# Patient Record
Sex: FEMALE | Race: WHITE | NOT HISPANIC OR LATINO | Employment: STUDENT | URBAN - METROPOLITAN AREA
[De-identification: names, ages, dates, MRNs, and addresses within clinical notes are randomized per-mention and may not be internally consistent; named-entity substitution may affect disease eponyms.]

---
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
## 2024-01-24 ENCOUNTER — VIRTUAL VISIT (OUTPATIENT)
Dept: PSYCHOLOGY | Facility: CLINIC | Age: 19
End: 2024-01-24
Payer: COMMERCIAL

## 2024-01-24 DIAGNOSIS — F33.1 MODERATE EPISODE OF RECURRENT MAJOR DEPRESSIVE DISORDER (H): Primary | ICD-10-CM

## 2024-01-24 DIAGNOSIS — F41.1 GENERALIZED ANXIETY DISORDER: ICD-10-CM

## 2024-01-24 PROCEDURE — 90834 PSYTX W PT 45 MINUTES: CPT | Mod: 93 | Performed by: SOCIAL WORKER

## 2024-01-24 NOTE — PROGRESS NOTES
[facility]                                     Progress Note    Patient Name: Ruchi Lerner  Date: 2024         Service Type: Individual      Session Start Time: 100  Session End Time: 140     Session Length: 38-52 mins    Session #: 6    Attendees: Client attended alone    Service Modality:  Phone call visit    Provider verified identity through the following two step process.  Patient provided:  Patient  and Patient address    Telemedicine Visit: The patient's condition can be safely assessed and treated via synchronous audio and visual telemedicine encounter.      Reason for Telemedicine Visit: Patient has requested telehealth visit    Originating Site (Patient Location): Patient's home    Distant Site (Provider Location): Provider Remote Setting- Home Office    Consent:  The patient/guardian has verbally consented to: the potential risks and benefits of telemedicine (video visit) versus in person care; bill my insurance or make self-payment for services provided; and responsibility for payment of non-covered services.     Phone call visit    Distant Location (Provider):  Off-site    As the provider I attest to compliance with applicable laws and regulations related to telemedicine.    DATA  Interactive Complexity: No  Crisis: No        Progress Since Last Session (Related to Symptoms / Goals / Homework):   Symptoms: Feeling a lot better as of recently.  More like self, reports light box, medication and using coping skills are helping.  Has not had SI, able to keep self safe.    Homework:  None assigned from last time.      Episode of Care Goals: Satisfactory progress - PREPARATION (Decided to change - considering how); Intervened by negotiating a change plan and determining options / strategies for behavior change, identifying triggers, exploring social supports, and working towards setting a date to begin behavior change                 Current / Ongoing Stressors and  Concerns:              Started new job at a [employer].  Will be starting new courses soon which she is excited about.  Feeling increased MDD symptoms due to seasons changing - started higher dose of meds with PCP and received a happy light.                 Treatment Objective(s) Addressed in This Session:          Preparing for changes                 Intervention:              Safety plan due to past SI (no concerns currently), discussed upcoming MONICA and options related to this.      Worked on gathering insight on bodies cues and reading these proactively before anxiety feels unmanageable.  Psychoeducation on this and used MI to gather further insight.  Used scaling questions, open ended questions, rolling with resistances, asking permission to raise concern or advise.  Discussed adding body/mind check ins 2-3 times at certain times each day, as Ruchi identified struggling with realizing how her body and mind are feeling until after the anxiety has completely passed looking back.  Discussed recommendations for phototherapy (morning use 30 mins after waking up for about 30 mins max, not staring directly at the light, etc).          Reviewed topics from last time (happy light, daily check ins, is going to the gym more), recommended two mood tracking apps.    Motivational Interviewing  Target Behavior:  Getting to class    Stage of Change: PREPARATION (Decided to change - considering how)    MI Intervention: Co-Developed Goal: Attending class regularly, Expressed Empathy/Understanding, Supported Autonomy, Collaboration, Evocation, Permission to raise concern or advise, Open-ended questions, and Reflections: simple and complex     Change Talk Expressed by the Patient: Desire to change Ability to change Reasons to change Need to change Committment to change Activation Taking steps    Provider Response to Change Talk: E - Evoked more info from patient about behavior change, A - Affirmed patient's thoughts,  decisions, or attempts at behavior change, R - Reflected patient's change talk, and S - Summarized patient's change talk statements    Emphasized writing down goals and rewarding self for hitting goals or milestones.    CURRENT: CBT/DBT skills of checking the facts used.  Exploration of coping skills to calm nervous system, practiced intentional pause skill.  Patient reported increased irritability and when/what is triggering it.  Normalized this as a symptom of depression and how to navigate it.  Explored use of CBT skills at work when anxiety comes up about connecting with coworkers.      Assessments completed prior to visit:  The following assessments were completed by patient for this visit:  PHQ9:   PHQ-9 SCORE 3/17/2021 5/21/2021 5/28/2021 3/11/2022 7/1/2022 11/4/2022 1/2/2023   PHQ-9 Total Score MyChart 9 (Mild depression) - - - - - 15 (Moderately severe depression)   PHQ-9 Total Score 9 - - 11 - - 15   PHQ-A Total Score - 14 14 11 9 9 -      GAD7:   CHERYL-7 SCORE 3/17/2021 5/21/2021 5/28/2021 3/11/2022 7/1/2022 11/4/2022 1/2/2023   Total Score 13 (moderate anxiety) - - - - - 11 (moderate anxiety)   Total Score 13 9 9 13 9 8 11      PROMIS Pediatric Scale v1.0 -Global Health 7+2: No questionnaires on file.                         ASSESSMENT: Current Emotional / Mental Status (status of significant symptoms):              Risk status (Self / Other harm or suicidal ideation)              Patient denies current fears or concerns for personal safety.              Patient denies current or recent suicidal ideation or behaviors.              Patient denies current or recent homicidal ideation or behaviors.              Patient denies current or recent self injurious behavior or ideation.              Patient denies other safety concerns.              Patient reports there has been no change in risk factors since their last session.                Patient reports there has been no change in protective factors since  their last session.                Recommended that patient call 911 or go to the local ED should there be a change in any of these risk factors.                 Appearance:                            Good, normal               Eye Contact:                           fair              Psychomotor Behavior:          Good, normal              Attitude:                                   Cooperative               Orientation:                             All              Speech                          Rate / Production:       Normal/ Responsive Normal                           Volume:                       Normal               Mood:                                      tired              Affect:                                      Subdued               Thought Content:                    Clear               Thought Form:                        Coherent  Logical               Insight:                                     Good                  Medication Review:              No changes to current psychiatric medication(s)                 Medication Compliance:              Yes                 Changes in Health Issues:              None reported                 Chemical Use Review:              Substance Use: Chemical use reviewed, no active concerns identified                  Tobacco Use: No current tobacco use.       Diagnosis:  1. Generalized anxiety disorder    2. Major depressive disorder, recurrent, in partial remission (H)          Collateral Reports Completed:              Not Applicable     PLAN: (Patient Tasks / Therapist Tasks / Other)  Continue with individual therapy. Work on preparing for changes.     Patient taking a break from therapy while primary therapist is out - will call to make return appointment mid-April.        TREVOR Singleton, LICSW                                                              ______________________________________________________________________     Individual Treatment  Plan     Patient's Name: Ruchi Lerner                       YOB: 2005     Date of Creation: 2019  Date Treatment Plan Last Reviewed/Revised: 6/1/2022, 9/28/2022, 1/2023, 4/2023; 7/26/2023; 11/22/2023        DSM5 Diagnoses: 296.31 (F33.0) Major Depressive Disorder, Recurrent Episode, Mild _ and With anxious distress or 300.02 (F41.1) Generalized Anxiety Disorder  Psychosocial / Contextual Factors: history of anxiety and depression  PROMIS (reviewed every 90 days):      Referral / Collaboration:  Referral to another professional/service is not indicated at this time..     Anticipated number of session for this episode of care: 15-20  Anticipation frequency of session: Monthly  Anticipated Duration of each session: 38-52 minutes  Treatment plan will be reviewed in 90 days or when goals have been changed.      MeasurableTreatment Goal(s) related to diagnosis / functional impairment(s)  Goal 1: Report a decrease in anxiety symptoms.      Objective #A (Client Action)                Status: Continued- Date(s): 11/22/2023     Client will use cognitive strategies identified in therapy to challenge anxious thoughts.     Intervention(s)  Therapist will explore origin of anxious thoughts using CBT.     Objective #B  Client will seek support from others when emotions are high and effectively communicate her emotions.                Status: Continued- Date(s): 11/22/2023        Intervention(s)  Therapist will teach new coping skills to practice and assign as homework.        Client and Parent / Guardian have reviewed and agreed to the above plan.        TREVOR Singleton, [facility] Care[facility]                                                                    Rosa Elena Leary [facility]  11/22/2023          [facility]                                       Ruchi Lerner     SAFETY PLAN:  Step 1: Warning signs / cues (Thoughts, images, mood, situation, behavior) that a crisis may be  "developing:  Thoughts: \"I don't matter\", \"I'm a burden\", and \"Nothing makes it better\"  Images: visions of harm: when feeling really upset, picturing self with cuts on wrists (hasn't happened recently but has happened in the past).  Thinking Processes: ruminations (can't stop thinking about my problems): feeling like I keep cycling through the same things,  and intrusive thoughts (bothersome, unwanted thoughts that come out of nowhere): where I will get a bad thought and it won't go away  Mood: worsening depression, intense anger, and agitation  Behaviors: not taking care of myself, not taking care of my responsibilities, and sleeping too much  Situations: relationship problems and issues with work school or increased stress     Step 2: Coping strategies - Things I can do to take my mind off of my problems without contacting another person (relaxation technique, physical activity):  Distress Tolerance Strategies:  arts and crafts: coloring book, play with my pet , and watch a funny movie: or tv show, reading   Physical Activities: go for a walk, meditation, and deep breathing  Focus on helpful thoughts:  self-compassion statements: it's okay to make mistakes, it's only temporary and things will be okay, focusing on what's important to me (family, boyfriend), focusing on happy memories.  Step 3: People and social settings that provide distraction:   Name: Darcy Bonilla Phone: phone number in phone   Name: Brother Johnathan Lange  Phone: phone number in phone   Name: Work friend - Tisha Phone: phone number in phone  park and [employer], being in my room, going to the mall    Step 4: Remind myself of people and things that are important to me and worth living for:  family and boyfriend, dog and cat, and friends  Step 5: When I am in crisis, I can ask these people to help me use my safety plan:   Name: Brother Johnathan Lange  Phone: Number in phone   Name: Darcy Bonilla Phone: Number in phone   Name: Parents   Phone: Number " in phone  Step 6: Making the environment safe:   be around others  Step 7: Professionals or agencies I can contact during a crisis:  The new Crisis line from any phone is 988, you can also text a message to this line.      Professionals or agencies I can contact during a crisis:    Suicide Prevention Lifeline: just dial 988  Crisis Text Line Service (available 24 hours a day, 7 days a week): Text [place] to 992552  Suicide LifeLine Chat: suicidepreventionlifeline.org/chat    COMMUNITY RESOURCES FOR MENTAL HEALTH EMERGENCIES:      [facility] [phone]   COPE 24/7 [place] Mobile Team [phone] (adults) [phone] (child)  Poison Control Center [phone]    OR  go to nearest ER  [facility]  [phone]  National [place] on Mental Illness (www.roni.org): 827.708.4494 or 240-739-2965.   Mental Health Association (www.mentalhealth.org): 779.359.1835 or 255-964-5500.  [employer]/resources for a list of additional resources (SOS)            [facility] [phone]   Urgent Care Adult Mental Health-[phone] mobile unit 24/7 crisis line  EMPATH unit in [facility]       Crisis Services By [facility]: Phone Number:   [place]     [phone]   [place]  [phone]   [facility]  [phone]   [place]    [phone]   [place]/ [place]    [phone]   [place] [phone]   [place]    [phone]   [place]    [phone]   [place] [phone]   [place]     [phone]         Call 911 or go to my nearest emergency department.   I helped develop this safety plan and agree to use it when needed.  I have been given a copy of this plan.      Client signature ____________Client agrees verbally, able to keep self safe, will send via [employer]  Today s date:  11/8/2023  Completed by Provider Name/ Credentials:  TREVOR Singleton, LICSW  November 8, 2023  Adapted from Safety Plan Template 2008 Shima Jc and  [place] is reprinted with the express permission of the authors.  No portion of the Safety Plan Template may be reproduced without the express, written permission.  You can contact the authors at [email or web address] or [email or web address].

## 2024-03-23 DIAGNOSIS — F41.1 GENERALIZED ANXIETY DISORDER: ICD-10-CM

## 2024-03-23 DIAGNOSIS — F33.41 MAJOR DEPRESSIVE DISORDER, RECURRENT, IN PARTIAL REMISSION (H): ICD-10-CM

## 2024-06-14 DIAGNOSIS — Z30.41 ENCOUNTER FOR SURVEILLANCE OF CONTRACEPTIVE PILLS: ICD-10-CM

## 2024-06-14 RX ORDER — NORGESTIMATE AND ETHINYL ESTRADIOL 0.25-0.035
1 KIT ORAL DAILY
Qty: 84 TABLET | Refills: 0 | Status: SHIPPED | OUTPATIENT
Start: 2024-06-14 | End: 2024-07-29

## 2024-06-14 RX ORDER — NORGESTIMATE AND ETHINYL ESTRADIOL 0.25-0.035
1 KIT ORAL DAILY
Qty: 84 TABLET | Refills: 3 | OUTPATIENT
Start: 2024-06-14

## 2024-06-14 NOTE — TELEPHONE ENCOUNTER
Mom calling.  Patient present and gave verbal consent for clinic to speak with mom.  No refills remaining on file at the pharmacy for birth control pills.  Will be out before appointment on 7/29/2024  Has about 2 weeks left.    Refill sent    Didi Ureña RN  [facility]

## 2024-07-12 DIAGNOSIS — F33.41 MAJOR DEPRESSIVE DISORDER, RECURRENT, IN PARTIAL REMISSION (H): ICD-10-CM

## 2024-07-12 DIAGNOSIS — F41.1 GENERALIZED ANXIETY DISORDER: ICD-10-CM

## 2024-07-29 ENCOUNTER — OFFICE VISIT (OUTPATIENT)
Dept: FAMILY MEDICINE | Facility: CLINIC | Age: 19
End: 2024-07-29
Payer: COMMERCIAL

## 2024-07-29 VITALS
HEART RATE: 81 BPM | OXYGEN SATURATION: 97 % | RESPIRATION RATE: 16 BRPM | WEIGHT: 159 LBS | HEIGHT: 62 IN | BODY MASS INDEX: 29.26 KG/M2 | DIASTOLIC BLOOD PRESSURE: 64 MMHG | SYSTOLIC BLOOD PRESSURE: 124 MMHG | TEMPERATURE: 97.6 F

## 2024-07-29 DIAGNOSIS — Z00.00 ROUTINE GENERAL MEDICAL EXAMINATION AT A HEALTH CARE FACILITY: Primary | ICD-10-CM

## 2024-07-29 DIAGNOSIS — Z30.41 ENCOUNTER FOR SURVEILLANCE OF CONTRACEPTIVE PILLS: ICD-10-CM

## 2024-07-29 DIAGNOSIS — F33.41 MAJOR DEPRESSIVE DISORDER, RECURRENT, IN PARTIAL REMISSION (H): ICD-10-CM

## 2024-07-29 DIAGNOSIS — F41.1 GENERALIZED ANXIETY DISORDER: ICD-10-CM

## 2024-07-29 DIAGNOSIS — Z11.3 SCREENING FOR STDS (SEXUALLY TRANSMITTED DISEASES): ICD-10-CM

## 2024-07-29 PROBLEM — F10.920 ALCOHOLIC INTOXICATION WITHOUT COMPLICATION (H): Status: RESOLVED | Noted: 2021-06-02 | Resolved: 2024-07-29

## 2024-07-29 PROCEDURE — 99395 PREV VISIT EST AGE 18-39: CPT | Mod: 25 | Performed by: PHYSICIAN ASSISTANT

## 2024-07-29 PROCEDURE — 99213 OFFICE O/P EST LOW 20 MIN: CPT | Mod: 25 | Performed by: PHYSICIAN ASSISTANT

## 2024-07-29 PROCEDURE — 90480 ADMN SARSCOV2 VAC 1/ONLY CMP: CPT | Performed by: PHYSICIAN ASSISTANT

## 2024-07-29 PROCEDURE — 87491 CHLMYD TRACH DNA AMP PROBE: CPT | Performed by: PHYSICIAN ASSISTANT

## 2024-07-29 PROCEDURE — 87591 N.GONORRHOEAE DNA AMP PROB: CPT | Performed by: PHYSICIAN ASSISTANT

## 2024-07-29 PROCEDURE — 91320 SARSCV2 VAC 30MCG TRS-SUC IM: CPT | Performed by: PHYSICIAN ASSISTANT

## 2024-07-29 RX ORDER — NORGESTIMATE AND ETHINYL ESTRADIOL 0.25-0.035
1 KIT ORAL DAILY
Qty: 84 TABLET | Refills: 3 | Status: SHIPPED | OUTPATIENT
Start: 2024-07-29

## 2024-07-29 RX ORDER — BUSPIRONE HYDROCHLORIDE 5 MG/1
5 TABLET ORAL 2 TIMES DAILY
Qty: 60 TABLET | Refills: 1 | Status: SHIPPED | OUTPATIENT
Start: 2024-07-29 | End: 2024-08-26

## 2024-07-29 SDOH — HEALTH STABILITY: PHYSICAL HEALTH: ON AVERAGE, HOW MANY DAYS PER WEEK DO YOU ENGAGE IN MODERATE TO STRENUOUS EXERCISE (LIKE A BRISK WALK)?: 3 DAYS

## 2024-07-29 ASSESSMENT — ANXIETY QUESTIONNAIRES
GAD7 TOTAL SCORE: 16
8. IF YOU CHECKED OFF ANY PROBLEMS, HOW DIFFICULT HAVE THESE MADE IT FOR YOU TO DO YOUR WORK, TAKE CARE OF THINGS AT HOME, OR GET ALONG WITH OTHER PEOPLE?: VERY DIFFICULT
1. FEELING NERVOUS, ANXIOUS, OR ON EDGE: NEARLY EVERY DAY
7. FEELING AFRAID AS IF SOMETHING AWFUL MIGHT HAPPEN: MORE THAN HALF THE DAYS
IF YOU CHECKED OFF ANY PROBLEMS ON THIS QUESTIONNAIRE, HOW DIFFICULT HAVE THESE PROBLEMS MADE IT FOR YOU TO DO YOUR WORK, TAKE CARE OF THINGS AT HOME, OR GET ALONG WITH OTHER PEOPLE: VERY DIFFICULT
4. TROUBLE RELAXING: NEARLY EVERY DAY
GAD7 TOTAL SCORE: 16
3. WORRYING TOO MUCH ABOUT DIFFERENT THINGS: NEARLY EVERY DAY
6. BECOMING EASILY ANNOYED OR IRRITABLE: SEVERAL DAYS
7. FEELING AFRAID AS IF SOMETHING AWFUL MIGHT HAPPEN: MORE THAN HALF THE DAYS
5. BEING SO RESTLESS THAT IT IS HARD TO SIT STILL: SEVERAL DAYS
2. NOT BEING ABLE TO STOP OR CONTROL WORRYING: NEARLY EVERY DAY
GAD7 TOTAL SCORE: 16

## 2024-07-29 ASSESSMENT — PATIENT HEALTH QUESTIONNAIRE - PHQ9
SUM OF ALL RESPONSES TO PHQ QUESTIONS 1-9: 11
10. IF YOU CHECKED OFF ANY PROBLEMS, HOW DIFFICULT HAVE THESE PROBLEMS MADE IT FOR YOU TO DO YOUR WORK, TAKE CARE OF THINGS AT HOME, OR GET ALONG WITH OTHER PEOPLE: SOMEWHAT DIFFICULT
SUM OF ALL RESPONSES TO PHQ QUESTIONS 1-9: 11

## 2024-07-29 ASSESSMENT — SOCIAL DETERMINANTS OF HEALTH (SDOH): HOW OFTEN DO YOU GET TOGETHER WITH FRIENDS OR RELATIVES?: TWICE A WEEK

## 2024-07-29 NOTE — PATIENT INSTRUCTIONS
Patient Education   Preventive Care Advice   This is general advice given by our system to help you stay healthy. However, your care team may have specific advice just for you. Please talk to your care team about your preventive care needs.  Nutrition  Eat 5 or more servings of fruits and vegetables each day.  Try wheat bread, brown rice and whole grain pasta (instead of white bread, rice, and pasta).  Get enough calcium and vitamin D. Check the label on foods and aim for 100% of the RDA (recommended daily allowance).  Lifestyle  Exercise at least 150 minutes each week  (30 minutes a day, 5 days a week).  Do muscle strengthening activities 2 days a week. These help control your weight and prevent disease.  No smoking.  Wear sunscreen to prevent skin cancer.  Have a dental exam and cleaning every 6 months.  Yearly exams  See your health care team every year to talk about:  Any changes in your health.  Any medicines your care team has prescribed.  Preventive care, family planning, and ways to prevent chronic diseases.  Shots (vaccines)   HPV shots (up to age 26), if you've never had them before.  Hepatitis B shots (up to age 59), if you've never had them before.  COVID-19 shot: Get this shot when it's due.  Flu shot: Get a flu shot every year.  Tetanus shot: Get a tetanus shot every 10 years.  Pneumococcal, hepatitis A, and RSV shots: Ask your care team if you need these based on your risk.  Shingles shot (for age 50 and up)  General health tests  Diabetes screening:  Starting at age 35, Get screened for diabetes at least every 3 years.  If you are younger than age 35, ask your care team if you should be screened for diabetes.  Cholesterol test: At age 39, start having a cholesterol test every 5 years, or more often if advised.  Bone density scan (DEXA): At age 50, ask your care team if you should have this scan for osteoporosis (brittle bones).  Hepatitis C: Get tested at least once in your life.  STIs (sexually  transmitted infections)  Before age 24: Ask your care team if you should be screened for STIs.  After age 24: Get screened for STIs if you're at risk. You are at risk for STIs (including HIV) if:  You are sexually active with more than one person.  You don't use condoms every time.  You or a partner was diagnosed with a sexually transmitted infection.  If you are at risk for HIV, ask about PrEP medicine to prevent HIV.  Get tested for HIV at least once in your life, whether you are at risk for HIV or not.  Cancer screening tests  Cervical cancer screening: If you have a cervix, begin getting regular cervical cancer screening tests starting at age 21.  Breast cancer scan (mammogram): If you've ever had breasts, begin having regular mammograms starting at age 40. This is a scan to check for breast cancer.  Colon cancer screening: It is important to start screening for colon cancer at age 45.  Have a colonoscopy test every 10 years (or more often if you're at risk) Or, ask your provider about stool tests like a FIT test every year or Cologuard test every 3 years.  To learn more about your testing options, visit:   .  For help making a decision, visit:   https://bit.ly/du67851.  Prostate cancer screening test: If you have a prostate, ask your care team if a prostate cancer screening test (PSA) at age 55 is right for you.  Lung cancer screening: If you are a current or former smoker ages 50 to 80, ask your care team if ongoing lung cancer screenings are right for you.  For informational purposes only. Not to replace the advice of your health care provider. Copyright   2023 [facility]. All rights reserved. Clinically reviewed by the [facility]. [employer] 019820 - REV 01/24.  Learning About Stress  What is stress?     Stress is your body's response to a hard situation. Your body can have a physical, emotional, or mental response. Stress is a fact of life for most people, and it  affects everyone differently. What causes stress for you may not be stressful for someone else.  A lot of things can cause stress. You may feel stress when you go on a job interview, take a test, or run a race. This kind of short-term stress is normal and even useful. It can help you if you need to work hard or react quickly. For example, stress can help you finish an important job on time.  Long-term stress is caused by ongoing stressful situations or events. Examples of long-term stress include long-term health problems, ongoing problems at work, or conflicts in your family. Long-term stress can harm your health.  How does stress affect your health?  When you are stressed, your body responds as though you are in danger. It makes hormones that speed up your heart, make you breathe faster, and give you a burst of energy. This is called the fight-or-flight stress response. If the stress is over quickly, your body goes back to normal and no harm is done.  But if stress happens too often or lasts too long, it can have bad effects. Long-term stress can make you more likely to get sick, and it can make symptoms of some diseases worse. If you tense up when you are stressed, you may develop neck, shoulder, or low back pain. Stress is linked to high blood pressure and heart disease.  Stress also harms your emotional health. It can make you ruiz, tense, or depressed. Your relationships may suffer, and you may not do well at work or school.  What can you do to manage stress?  You can try these things to help manage stress:   Do something active. Exercise or activity can help reduce stress. Walking is a great way to get started. Even everyday activities such as housecleaning or yard work can help.  Try yoga or david chi. These techniques combine exercise and meditation. You may need some training at first to learn them.  Do something you enjoy. For example, listen to music or go to a movie. Practice your hobby or do volunteer  "work.  Meditate. This can help you relax, because you are not worrying about what happened before or what may happen in the future.  Do guided imagery. Imagine yourself in any setting that helps you feel calm. You can use online videos, books, or a teacher to guide you.  Do breathing exercises. For example:  From a standing position, bend forward from the waist with your knees slightly bent. Let your arms dangle close to the floor.  Breathe in slowly and deeply as you return to a standing position. Roll up slowly and lift your head last.  Hold your breath for just a few seconds in the standing position.  Breathe out slowly and bend forward from the waist.  Let your feelings out. Talk, laugh, cry, and express anger when you need to. Talking with supportive friends or family, a counselor, or a kurt leader about your feelings is a healthy way to relieve stress. Avoid discussing your feelings with people who make you feel worse.  Write. It may help to write about things that are bothering you. This helps you find out how much stress you feel and what is causing it. When you know this, you can find better ways to cope.  What can you do to prevent stress?  You might try some of these things to help prevent stress:  Manage your time. This helps you find time to do the things you want and need to do.  Get enough sleep. Your body recovers from the stresses of the day while you are sleeping.  Get support. Your family, friends, and community can make a difference in how you experience stress.  Limit your news feed. Avoid or limit time on social media or news that may make you feel stressed.  Do something active. Exercise or activity can help reduce stress. Walking is a great way to get started.  Where can you learn more?  Go to https://www.healthwise.net/patiented  Enter N032 in the search box to learn more about \"Learning About Stress.\"  Current as of: October 24, 2023               Content Version: 14.0    6755-2857 " [employer].   Care instructions adapted under license by your healthcare professional. If you have questions about a medical condition or this instruction, always ask your healthcare professional. [employer] disclaims any warranty or liability for your use of this information.      Learning About Depression Screening  What is depression screening?  Depression screening is a way to see if you have depression symptoms. It may be done by a doctor or counselor. It's often part of a routine checkup. That's because your mental health is just as important as your physical health.  Depression is a mental health condition that affects how you feel, think, and act. You may:  Have less energy.  Lose interest in your daily activities.  Feel sad and grouchy for a long time.  Depression is very common. It affects people of all ages.  Many things can lead to depression. Some people become depressed after they have a stroke or find out they have a major illness like cancer or heart disease. The death of a loved one or a breakup may lead to depression. It can run in families. Most experts believe that a combination of inherited genes and stressful life events can cause it.  What happens during screening?  You may be asked to fill out a form about your depression symptoms. You and the doctor will discuss your answers. The doctor may ask you more questions to learn more about how you think, act, and feel.  What happens after screening?  If you have symptoms of depression, your doctor will talk to you about your options.  Doctors usually treat depression with medicines or counseling. Often, combining the two works best. Many people don't get help because they think that they'll get over the depression on their own. But people with depression may not get better unless they get treatment.  The cause of depression is not well understood. There may be many factors involved. But if you have depression, it's not  "your fault.  A serious symptom of depression is thinking about death or suicide. If you or someone you care about talks about this or about feeling hopeless, get help right away.  It's important to know that depression can be treated. Medicine, counseling, and self-care may help.  Where can you learn more?  Go to https://www.[employer]/patiented  Enter T185 in the search box to learn more about \"Learning About Depression Screening.\"  Current as of: June 24, 2023               Content Version: 14.0    0923-9082 [employer].   Care instructions adapted under license by your healthcare professional. If you have questions about a medical condition or this instruction, always ask your healthcare professional. [employer] disclaims any warranty or liability for your use of this information.      Substance Use Disorder: Care Instructions  Overview     You can improve your life and health by stopping your use of alcohol or drugs. When you don't drink or use drugs, you may feel and sleep better. You may get along better with your family, friends, and coworkers. There are medicines and programs that can help with substance use disorder.  How can you care for yourself at home?  Here are some ways to help you stay sober and prevent relapse.  If you have been given medicine to help keep you sober or reduce your cravings, be sure to take it exactly as prescribed.  Talk to your doctor about programs that can help you stop using drugs or drinking alcohol.  Do not keep alcohol or drugs in your home.  Plan ahead. Think about what you'll say if other people ask you to drink or use drugs. Try not to spend time with people who drink or use drugs.  Use the time and money spent on drinking or drugs to do something that's important to you.  Preventing a relapse  Have a plan to deal with relapse. Learn to recognize changes in your thinking that lead you to drink or use drugs. Get help before you start to drink " or use drugs again.  Try to stay away from situations, friends, or places that may lead you to drink or use drugs.  If you feel the need to drink alcohol or use drugs again, seek help right away. Call a trusted friend or family member. Some people get support from organizations such as Narcotics Anonymous or [employer] or from treatment facilities.  If you relapse, get help as soon as you can. Some people make a plan with another person that outlines what they want that person to do for them if they relapse. The plan usually includes how to handle the relapse and who to notify in case of relapse.  Don't give up. Remember that a relapse doesn't mean that you have failed. Use the experience to learn the triggers that lead you to drink or use drugs. Then quit again. Recovery is a lifelong process. Many people have several relapses before they are able to quit for good.  Follow-up care is a key part of your treatment and safety. Be sure to make and go to all appointments, and call your doctor if you are having problems. It's also a good idea to know your test results and keep a list of the medicines you take.  When should you call for help?   Call 531  anytime you think you may need emergency care. For example, call if you or someone else:    Has overdosed or has withdrawal signs. Be sure to tell the emergency workers that you are or someone else is using or trying to quit using drugs. Overdose or withdrawal signs may include:  Losing consciousness.  Seizure.  Seeing or hearing things that aren't there (hallucinations).     Is thinking or talking about suicide or harming others.   Where to get help 24 hours a day, 7 days a week   If you or someone you know talks about suicide, self-harm, a mental health crisis, a substance use crisis, or any other kind of emotional distress, get help right away. You can:    Call the Suicide and Crisis Lifeline at 823.     Call 7-817-928-TALK (1-732.407.7765).     Text HOME to 120224  "to access the Crisis Text Line.   Consider saving these numbers in your phone.  Go to [employer] for more information or to chat online.  Call your doctor now or seek immediate medical care if:    You are having withdrawal symptoms. These may include nausea or vomiting, sweating, shakiness, and anxiety.   Watch closely for changes in your health, and be sure to contact your doctor if:    You have a relapse.     You need more help or support to stop.   Where can you learn more?  Go to https://www.Pivot[employer]k.net/patiented  Enter H573 in the search box to learn more about \"Substance Use Disorder: Care Instructions.\"  Current as of: November 15, 2023               Content Version: 14.0    3707-6203 [employer].   Care instructions adapted under license by your healthcare professional. If you have questions about a medical condition or this instruction, always ask your healthcare professional. Healthwise, [employer] disclaims any warranty or liability for your use of this information.         "

## 2024-07-29 NOTE — PROGRESS NOTES
"Preventive Care Visit  [facility] SUJEY Davey PA-C, Family Medicine  Jul 29, 2024      Assessment & Plan     Routine general medical examination at a health care facility      HEALTH CARE MAINTENANCE              Reviewed USPTF recommendations and anticipatory guidance.              See orders.   Reviewed vaccines, due for covid booster.  Given today.         Screening for STDs (sexually transmitted diseases)  I discussed the transmission and risks associated with STD's as well as appropriate prevention, screening and treatment.    - NEISSERIA GONORRHOEA PCR  - CHLAMYDIA TRACHOMATIS PCR          (F41.1) Generalized anxiety disorder  Comment: Going through a lot of big changes/transitions.  Cut back on school from full time to part time and she thinks this will go better this semester.  We discussed med options, either switching prozac to something new, adding on or leaving as is.  She prefers to try adding on for now.  Will also restart counseling.   Continue to limit caffine.   Plan: busPIRone (BUSPAR) 5 MG tablet, FLUoxetine         (PROZAC) 20 MG capsule, Adult Mental Health          Referral            (F33.41) Major depressive disorder, recurrent, in partial remission (H24)  Comment: stable.   Plan: FLUoxetine (PROZAC) 20 MG capsule, Adult Mental        Health  Referral            (Z30.41) Encounter for surveillance of contraceptive pills  Comment: doing well, reminded her to use condoms as back up.   Plan: norgestimate-ethinyl estradiol (ESTARYLLA)         0.25-35 MG-MCG tablet                Patient has been advised of split billing requirements and indicates understanding: Yes        BMI  Estimated body mass index is 29.45 kg/m  as calculated from the following:    Height as of this encounter: 1.565 m (5' 1.61\").    Weight as of this encounter: 72.1 kg (159 lb).       Counseling  Appropriate preventive services were addressed with this patient via screening, " questionnaire, or discussion as appropriate for fall prevention, nutrition, physical activity, Tobacco-use cessation, weight loss and cognition.  Checklist reviewing preventive services available has been given to the patient.  Reviewed patient's diet, addressing concerns and/or questions.   She is at risk for lack of exercise and has been provided with information to increase physical activity for the benefit of her well-being.   The patient's PHQ-9 score is consistent with moderate depression. She was provided with information regarding depression.           Rizwan Hopper is a 18 year old, presenting for the following:  Physical        7/29/2024     2:54 PM   Additional Questions   Roomed by Chrystal   Accompanied by Self         7/29/2024     2:54 PM   Patient Reported Additional Medications   Patient reports taking the following new medications na        Health Care Directive  Patient does not have a Health Care Directive or Living Will: Discussed advance care planning with patient; however, patient declined at this time.    HPI      Patient with history of Depression and Anxiety arrived for Annual Physical. GAD7 and PHQ9 completed online.    Patient is not fasting for lab work.    Depression and Anxiety   How are you doing with your depression since your last visit? Improved   How are you doing with your anxiety since your last visit?  Worsened   Are you having other symptoms that might be associated with depression or anxiety? No  Have you had a significant life event? Yes   Do you have any concerns with your use of alcohol or other drugs? No    On prozac 60 mg and tolerating it well.  On this about a year.    No excessive caffeine.        Social History     Tobacco Use    Smoking status: Never    Smokeless tobacco: Never   Vaping Use    Vaping status: Some Days   Substance Use Topics    Alcohol use: Yes    Drug use: No         11/1/2023     5:27 PM 11/22/2023     9:30 AM 7/29/2024     2:45 PM   PHQ    PHQ-9 Total Score 10 6 11   Q9: Thoughts of better off dead/self-harm past 2 weeks Not at all Not at all Not at all         11/1/2023     5:27 PM 11/22/2023     9:30 AM 7/29/2024     2:46 PM   CHERYL-7 SCORE   Total Score   16 (severe anxiety)   Total Score 8 9 16         7/29/2024     2:45 PM   Last PHQ-9   1.  Little interest or pleasure in doing things 1   2.  Feeling down, depressed, or hopeless 2   3.  Trouble falling or staying asleep, or sleeping too much 1   4.  Feeling tired or having little energy 2   5.  Poor appetite or overeating 1   6.  Feeling bad about yourself 3   7.  Trouble concentrating 1   8.  Moving slowly or restless 0   Q9: Thoughts of better off dead/self-harm past 2 weeks 0   PHQ-9 Total Score 11         7/29/2024     2:46 PM   CHERYL-7    1. Feeling nervous, anxious, or on edge 3   2. Not being able to stop or control worrying 3   3. Worrying too much about different things 3   4. Trouble relaxing 3   5. Being so restless that it is hard to sit still 1   6. Becoming easily annoyed or irritable 1   7. Feeling afraid, as if something awful might happen 2   CHERYL-7 Total Score 16   If you checked any problems, how difficult have they made it for you to do your work, take care of things at home, or get along with other people? Very difficult       Suicide Assessment Five-step Evaluation and Treatment (SAFE-T)          7/29/2024   General Health   How would you rate your overall physical health? Good   Feel stress (tense, anxious, or unable to sleep) Rather much      (!) STRESS CONCERN      7/29/2024   Nutrition   Three or more servings of calcium each day? Yes   Diet: Vegetarian/vegan   How many servings of fruit and vegetables per day? (!) 2-3   How many sweetened beverages each day? 0-1            7/29/2024   Exercise   Days per week of moderate/strenous exercise 3 days            7/29/2024   Social Factors   Frequency of gathering with friends or relatives Twice a week   Worry food won't last  until get money to buy more No   Food not last or not have enough money for food? No   Do you have housing? (Housing is defined as stable permanent housing and does not include staying ouside in a car, in a tent, in an abandoned building, in an overnight shelter, or couch-surfing.) Yes   Are you worried about losing your housing? No   Lack of transportation? No   Unable to get utilities (heat,electricity)? No            7/29/2024   Dental   Dentist two times every year? Yes            7/29/2024   TB Screening   Were you born outside of the [place]? No          Today's PHQ-9 Score:       7/29/2024     2:45 PM   PHQ-9 SCORE   PHQ-9 Total Score MyChart 11 (Moderate depression)   PHQ-9 Total Score 11         7/29/2024   Substance Use   Alcohol more than 3/day or more than 7/wk No   Do you use any other substances recreationally? (!) CANNABIS PRODUCTS        Social History     Tobacco Use    Smoking status: Never    Smokeless tobacco: Never   Vaping Use    Vaping status: Some Days   Substance Use Topics    Alcohol use: Yes    Drug use: No           7/29/2024   STI Screening   New sexual partner(s) since last STI/HIV test? No        History of abnormal Pap smear: No - under age 21, PAP not appropriate for age             7/29/2024   Contraception/Family Planning   Questions about contraception or family planning No           Reviewed and updated as needed this visit by Provider   Tobacco  Allergies  Meds  Problems  Med Hx  Surg Hx  Fam Hx  Soc   Hx Sexual Activity          Past Medical History:   Diagnosis Date    NO ACTIVE PROBLEMS      Labs reviewed in EPIC      Review of Systems  Constitutional, neuro, ENT, endocrine, pulmonary, cardiac, gastrointestinal, genitourinary, musculoskeletal, integument and psychiatric systems are negative, except as otherwise noted.     Objective    Exam  /64 (BP Location: Left arm, Patient Position: Chair, Cuff Size: Adult Regular)   Pulse 81   Temp 97.6  F (36.4  C) (Temporal)   " Resp 16   Ht 1.565 m (5' 1.61\")   Wt 72.1 kg (159 lb)   LMP 07/23/2024 (Approximate)   SpO2 97%   BMI 29.45 kg/m     Estimated body mass index is 29.45 kg/m  as calculated from the following:    Height as of this encounter: 1.565 m (5' 1.61\").    Weight as of this encounter: 72.1 kg (159 lb).    Physical Exam  GENERAL: alert and no distress  EYES: Eyes grossly normal to inspection, PERRL and conjunctivae and sclerae normal  HENT: ear canals and TM's normal, nose and mouth without ulcers or lesions  NECK: no adenopathy, no asymmetry, masses, or scars  RESP: lungs clear to auscultation - no rales, rhonchi or wheezes  CV: regular rate and rhythm, normal S1 S2, no S3 or S4, no murmur, click or rub, no peripheral edema  ABDOMEN: soft, nontender, no hepatosplenomegaly, no masses and bowel sounds normal  MS: no gross musculoskeletal defects noted, no edema  SKIN: no suspicious lesions or rashes  NEURO: Normal strength and tone, mentation intact and speech normal  PSYCH: mentation appears normal, affect normal/bright        Vision Screen       Hearing Screen           Signed Electronically by: Leticia Davey PA-C    Answers submitted by the patient for this visit:  Patient Health Questionnaire (Submitted on 7/29/2024)  If you checked off any problems, how difficult have these problems made it for you to do your work, take care of things at home, or get along with other people?: Somewhat difficult  PHQ9 TOTAL SCORE: 11  CHERYL-7 (Submitted on 7/29/2024)  CHERYL 7 TOTAL SCORE: 16    "

## 2024-07-30 LAB
C TRACH DNA SPEC QL NAA+PROBE: NEGATIVE
N GONORRHOEA DNA SPEC QL NAA+PROBE: NEGATIVE

## 2024-08-26 ENCOUNTER — VIRTUAL VISIT (OUTPATIENT)
Dept: FAMILY MEDICINE | Facility: CLINIC | Age: 19
End: 2024-08-26
Payer: COMMERCIAL

## 2024-08-26 DIAGNOSIS — F41.1 GENERALIZED ANXIETY DISORDER: ICD-10-CM

## 2024-08-26 DIAGNOSIS — F33.41 MAJOR DEPRESSIVE DISORDER, RECURRENT, IN PARTIAL REMISSION (H): ICD-10-CM

## 2024-08-26 PROCEDURE — 96127 BRIEF EMOTIONAL/BEHAV ASSMT: CPT | Mod: 95 | Performed by: PHYSICIAN ASSISTANT

## 2024-08-26 PROCEDURE — G2211 COMPLEX E/M VISIT ADD ON: HCPCS | Mod: 95 | Performed by: PHYSICIAN ASSISTANT

## 2024-08-26 PROCEDURE — 99214 OFFICE O/P EST MOD 30 MIN: CPT | Mod: 95 | Performed by: PHYSICIAN ASSISTANT

## 2024-08-26 RX ORDER — BUSPIRONE HYDROCHLORIDE 5 MG/1
5 TABLET ORAL 2 TIMES DAILY
Qty: 180 TABLET | Refills: 1 | Status: SHIPPED | OUTPATIENT
Start: 2024-08-26

## 2024-08-26 ASSESSMENT — ANXIETY QUESTIONNAIRES
4. TROUBLE RELAXING: NOT AT ALL
GAD7 TOTAL SCORE: 6
GAD7 TOTAL SCORE: 6
7. FEELING AFRAID AS IF SOMETHING AWFUL MIGHT HAPPEN: SEVERAL DAYS
5. BEING SO RESTLESS THAT IT IS HARD TO SIT STILL: SEVERAL DAYS
7. FEELING AFRAID AS IF SOMETHING AWFUL MIGHT HAPPEN: SEVERAL DAYS
1. FEELING NERVOUS, ANXIOUS, OR ON EDGE: SEVERAL DAYS
IF YOU CHECKED OFF ANY PROBLEMS ON THIS QUESTIONNAIRE, HOW DIFFICULT HAVE THESE PROBLEMS MADE IT FOR YOU TO DO YOUR WORK, TAKE CARE OF THINGS AT HOME, OR GET ALONG WITH OTHER PEOPLE: SOMEWHAT DIFFICULT
3. WORRYING TOO MUCH ABOUT DIFFERENT THINGS: SEVERAL DAYS
GAD7 TOTAL SCORE: 6
8. IF YOU CHECKED OFF ANY PROBLEMS, HOW DIFFICULT HAVE THESE MADE IT FOR YOU TO DO YOUR WORK, TAKE CARE OF THINGS AT HOME, OR GET ALONG WITH OTHER PEOPLE?: SOMEWHAT DIFFICULT
6. BECOMING EASILY ANNOYED OR IRRITABLE: SEVERAL DAYS
2. NOT BEING ABLE TO STOP OR CONTROL WORRYING: SEVERAL DAYS

## 2024-08-26 ASSESSMENT — PATIENT HEALTH QUESTIONNAIRE - PHQ9
SUM OF ALL RESPONSES TO PHQ QUESTIONS 1-9: 4
SUM OF ALL RESPONSES TO PHQ QUESTIONS 1-9: 4
10. IF YOU CHECKED OFF ANY PROBLEMS, HOW DIFFICULT HAVE THESE PROBLEMS MADE IT FOR YOU TO DO YOUR WORK, TAKE CARE OF THINGS AT HOME, OR GET ALONG WITH OTHER PEOPLE: NOT DIFFICULT AT ALL

## 2024-08-26 NOTE — PROGRESS NOTES
"Ruchi is a 18 year old who is being evaluated via a billable video visit.    How would you like to obtain your AVS? MyChart  If the video visit is dropped, the invitation should be resent by: Text to cell phone: [phone]  Will anyone else be joining your video visit? No      Assessment & Plan     Generalized anxiety disorder  Will leave medication as is for now.  She may message me if she would like to bump up buspirone to 7.5 mg BID if school starts to increase anxiety levels again.   I encouraged her to establish with a therapist, may look into school counselor as an option (this likely will be more affordable and/or free).   - busPIRone (BUSPAR) 5 MG tablet; Take 1 tablet (5 mg) by mouth 2 times daily.  - FLUoxetine (PROZAC) 20 MG capsule; Take 3 capsules (60 mg) by mouth daily.    Major depressive disorder, recurrent, in partial remission (H24)  Will leave as is.   - FLUoxetine (PROZAC) 20 MG capsule; Take 3 capsules (60 mg) by mouth daily.      The longitudinal plan of care for the diagnosis(es)/condition(s) as documented were addressed during this visit. Due to the added complexity in care, I will continue to support Ruchi in the subsequent management and with ongoing continuity of care.      BMI  Estimated body mass index is 29.45 kg/m  as calculated from the following:    Height as of 7/29/24: 1.565 m (5' 1.61\").    Weight as of 7/29/24: 72.1 kg (159 lb).             Subjective   Ruchi is a 18 year old, presenting for the following health issues:  Recheck Medication        8/26/2024     2:56 PM   Additional Questions   Roomed by Chrystal   Accompanied by Self         8/26/2024     2:56 PM   Patient Reported Additional Medications   Patient reports taking the following new medications NA     History of Present Illness       She eats 2-3 servings of fruits and vegetables daily.She consumes 1 sweetened beverage(s) daily.She exercises with enough effort to increase her heart rate 20 to 29 minutes per " day.  She exercises with enough effort to increase her heart rate 4 days per week.   She is taking medications regularly.     Patient with history of Anxiety and Depression arrived for medication follow-up. PHQ9 and GAD7 completed online.     Depression and Anxiety   How are you doing with your depression since your last visit? Improved   How are you doing with your anxiety since your last visit?  Improved   Are you having other symptoms that might be associated with depression or anxiety? No  Have you had a significant life event? No   Do you have any concerns with your use of alcohol or other drugs? No    She does feel improvement with anxiety. No side effects since adding buspirone.   She has not met therapist yet.   School started today and she is part time.     Social History     Tobacco Use    Smoking status: Never    Smokeless tobacco: Never   Vaping Use    Vaping status: Some Days   Substance Use Topics    Alcohol use: Yes    Drug use: No         11/22/2023     9:30 AM 7/29/2024     2:45 PM 8/26/2024     2:43 PM   PHQ   PHQ-9 Total Score 6 11 4   Q9: Thoughts of better off dead/self-harm past 2 weeks Not at all Not at all Not at all         11/22/2023     9:30 AM 7/29/2024     2:46 PM 8/26/2024     2:44 PM   CHERYL-7 SCORE   Total Score  16 (severe anxiety) 6 (mild anxiety)   Total Score 9 16 6         8/26/2024     2:43 PM   Last PHQ-9   1.  Little interest or pleasure in doing things 1   2.  Feeling down, depressed, or hopeless 1   3.  Trouble falling or staying asleep, or sleeping too much 0   4.  Feeling tired or having little energy 1   5.  Poor appetite or overeating 0   6.  Feeling bad about yourself 1   7.  Trouble concentrating 0   8.  Moving slowly or restless 0   Q9: Thoughts of better off dead/self-harm past 2 weeks 0   PHQ-9 Total Score 4         8/26/2024     2:44 PM   CHERYL-7    1. Feeling nervous, anxious, or on edge 1   2. Not being able to stop or control worrying 1   3. Worrying too much about  different things 1   4. Trouble relaxing 0   5. Being so restless that it is hard to sit still 1   6. Becoming easily annoyed or irritable 1   7. Feeling afraid, as if something awful might happen 1   CHERYL-7 Total Score 6   If you checked any problems, how difficult have they made it for you to do your work, take care of things at home, or get along with other people? Somewhat difficult       Suicide Assessment Five-step Evaluation and Treatment (SAFE-T)          Review of Systems  Constitutional, neuro, ENT, endocrine, pulmonary, cardiac, gastrointestinal, genitourinary, musculoskeletal, integument and psychiatric systems are negative, except as otherwise noted.      Objective           Vitals:  No vitals were obtained today due to virtual visit.    Physical Exam   GENERAL: alert and no distress  EYES: Eyes grossly normal to inspection.  No discharge or erythema, or obvious scleral/conjunctival abnormalities.  RESP: No audible wheeze, cough, or visible cyanosis.    SKIN: Visible skin clear. No significant rash, abnormal pigmentation or lesions.  NEURO: Cranial nerves grossly intact.  Mentation and speech appropriate for age.  PSYCH: Appropriate affect, tone, and pace of words          Video-Visit Details    Type of service:  Video Visit   Originating Location (pt. Location): Home    Distant Location (provider location):  On-site  Platform used for Video Visit: Courtney  Signed Electronically by: Leticia Davey PA-C

## 2025-04-29 NOTE — PROGRESS NOTES
Orders:    Vitamin D 25 hydroxy; Future    Vitamin D 25 hydroxy; Future     "                                               Progress Note    Client Name: Ruchi Lerner  Date: 12/22/2020         Service Type: Individual  Video Visit: No     Session Start Time: 4:40pm  Session End Time: 5:00pm     Session Length: 20minutes    Session #: 46    Attendees: Client attended alone    The patient has been notified of the following:      \"We have found that certain health care needs can be provided without the need for a face to face visit.  This service lets us provide the care you need with a phone conversation.       I will have full access to your [facility] medical record during this entire phone call.   I will be taking notes for your medical record.      Since this is like an office visit, we will bill your insurance company for this service.       There are potential benefits and risks of telephone visits (e.g. limits to patient confidentiality) that differ from in-person visits.?  Confidentiality still applies for telephone services, and nobody will record the visit.  It is important to be in a quiet, private space that is free of distractions (including cell phone or other devices) during the visit.??      If during the course of the call I believe a telephone visit is not appropriate, you will not be charged for this service\"     Consent has been obtained for this service by care team member: Yes        Treatment Plan Last Reviewed: 8/25/2020  DATA  Interactive Complexity: No  Crisis: No       Progress Since Last Session (Related to Symptoms / Goals / Homework):  Symptoms: improving, some anxiety around the coronavirus increasing cases and health of her loved ones. Some disappointment in her grade at the end of the trimester    Homework: Partially completed      Episode of Care Goals: Satisfactory progress - ACTION (Actively working towards change); Intervened by reinforcing change plan / affirming steps taken     Current / Ongoing Stressors and Concerns:   School is officially on " winter break and she is trying to figure out what she can do to pass time. Worried that she is going to be very bored and not motivated to do things. Was disappointed with her academics even though she made B honor roll this trimester. She was hoping for higher grades/      Treatment Objective(s) Addressed in This Session:   Processing disappointment and problem solving     Intervention:  CBT: Exploring disappointments and hardships around school and everything being online. Explored how she has struggled with different approaches to school with distance learning and how sometimes motivation is challenging from home, as she has fallen asleep or nodded off more in classes now than ever before.     ASSESSMENT: Current Emotional / Mental Status (status of significant symptoms):   Risk status (Self / Other harm or suicidal ideation)   Client denies current fears or concerns for personal safety.   Client denies current or recent suicidal ideation or behaviors.   Client denies current or recent homicidal ideation or behaviors.   Client denies current or recent self injurious behavior or ideation.   Client denies other safety concerns.   Client Client reports there has been no change in risk factors since their last session.     Client Client reports there has been no change in protective factors since their last session.     A safety and risk management plan has not been developed at this time, however client was given the after-hours number / 911 should there be a change in any of these risk factors.     Appearance:   Could not assess, phone session   Eye Contact:   Could not assess, phone session   Psychomotor Behavior: Could not assess, phone session   Attitude:   Cooperative    Orientation:   All   Speech    Rate / Production: Normal     Volume:  Normal    Mood:    Euthymic   Affect:    Appropriate    Thought Content:  Clear    Thought Form:  Coherent  Goal Directed  Logical    Insight:    Fair      Medication  Review:   No changes to current psychiatric medication(s)     Medication Compliance:   Yes     Changes in Health Issues:   None reported     Chemical Use Review:   Substance Use: Chemical use reviewed, no active concerns identified      Tobacco Use: No current tobacco use.      Diagnosis:  Major Depressive Disorder, Recurrent Episode, In partial remission  Generalized Anxiety Disorder    Collateral Reports Completed:   Not Applicable    PLAN: (Client Tasks / Therapist Tasks / Other)  Continue with individual therapy.  Identifying ways to pass time and decrease boredom over break.  Janae Franklin, [facility]  ________________________________________________________________________    Treatment Plan    Client's Name: Ruchi Lerner  YOB: 2005    Date: 8/25/2020    DSM-V Diagnoses: 296.35 (F33.41)  Major Depressive Disorder, Recurrent Episode, In partial remission _ or 300.02 (F41.1) Generalized Anxiety Disorder  Psychosocial / Contextual Factors: struggling with school being cancelled in person due to COVID-19, stuck inside with family and not seeing friends  WHODAS: N/A.    Referral / Collaboration:  Referral to another professional/service is not indicated at this time..    Anticipated number of session or this episode of care: 16-20.      MeasurableTreatment Goal(s) related to diagnosis / functional impairment(s)  Goal 1: Report a decrease in anxiety symptoms.     Objective #A (Client Action)    Status: Continued- Date - 08/25/2020  Client will use cognitive strategies identified in therapy to challenge anxious thoughts.    Intervention(s)  Therapist will explore origin of anxious thoughts using CBT.    Objective #B  Client will use at least 2 new coping skills for anxiety management in the next 12 weeks.    Status: Continued- Date - 08/25/2020    Intervention(s)  Therapist will teach new coping skills to practice and assign as homework.    Goal 2: Client will notice a increase in motivation,  energy, and interest     I will know I've met my goal when I want to do things more.      Objective #A (Client Action)    Client will Increase interest, engagement, and pleasure in doing things.  Status: Continued- Date - 08/25/2020    Intervention(s)  Therapist will use DBT and CBT skills to assess depressive symptoms.    Objective #B  Client will Decrease frequency and intensity of feeling down, depressed, hopeless.    Status: Completed - Date: 08/25/2020     Intervention(s)  Therapist will allow client to explore triggers for depressive symptoms and how to increase coping skills.    Objective #C  Client will Feel less tired and more energy during the day .  Status: Continued- Date - 08/25/2020    Intervention(s)  Therapist will assign homework to focus on getting quality sleep at night and less staying up late on electronics.    Goal 3: Client will think through consequences to choices.    Objective #A (Client Action)    Client will think about how their decisions impact their relationships.  Status: New - Date: 8/25/2020     Intervention(s)  Therapist will review empathy and interpersonal relationships.    Objective #B  Client will think through choices and consequences.    Status: New - Date: 8/25/2020     Intervention(s)  Therapist will explore healthy decision making.      Client and Parent / Guardian have reviewed and agreed to the above plan.      Janae Franklin, LPC December 15, 2020